# Patient Record
Sex: FEMALE | Race: WHITE | URBAN - NONMETROPOLITAN AREA
[De-identification: names, ages, dates, MRNs, and addresses within clinical notes are randomized per-mention and may not be internally consistent; named-entity substitution may affect disease eponyms.]

---

## 2017-02-23 ENCOUNTER — OFFICE VISIT (OUTPATIENT)
Dept: CARDIOLOGY | Facility: CLINIC | Age: 77
End: 2017-02-23
Payer: MEDICARE

## 2017-02-23 VITALS
OXYGEN SATURATION: 97 % | HEIGHT: 62 IN | BODY MASS INDEX: 23.55 KG/M2 | WEIGHT: 128 LBS | SYSTOLIC BLOOD PRESSURE: 150 MMHG | DIASTOLIC BLOOD PRESSURE: 64 MMHG | HEART RATE: 89 BPM

## 2017-02-23 DIAGNOSIS — R00.2 PALPITATION: ICD-10-CM

## 2017-02-23 PROCEDURE — 93000 ELECTROCARDIOGRAM COMPLETE: CPT | Performed by: INTERNAL MEDICINE

## 2017-02-23 PROCEDURE — G8432 DEP SCR NOT DOC, RNG: HCPCS | Performed by: INTERNAL MEDICINE

## 2017-02-23 PROCEDURE — G8420 CALC BMI NORM PARAMETERS: HCPCS | Performed by: INTERNAL MEDICINE

## 2017-02-23 PROCEDURE — 1101F PT FALLS ASSESS-DOCD LE1/YR: CPT | Mod: 8P | Performed by: INTERNAL MEDICINE

## 2017-02-23 PROCEDURE — G8484 FLU IMMUNIZE NO ADMIN: HCPCS | Performed by: INTERNAL MEDICINE

## 2017-02-23 PROCEDURE — 99204 OFFICE O/P NEW MOD 45 MIN: CPT | Performed by: INTERNAL MEDICINE

## 2017-02-23 PROCEDURE — 4040F PNEUMOC VAC/ADMIN/RCVD: CPT | Mod: 8P | Performed by: INTERNAL MEDICINE

## 2017-02-23 PROCEDURE — 4004F PT TOBACCO SCREEN RCVD TLK: CPT | Mod: 8P | Performed by: INTERNAL MEDICINE

## 2017-02-23 RX ORDER — LOSARTAN POTASSIUM 50 MG/1
50 TABLET ORAL DAILY
COMMUNITY

## 2017-02-23 RX ORDER — PRAVASTATIN SODIUM 20 MG
20 TABLET ORAL NIGHTLY
COMMUNITY

## 2017-02-23 RX ORDER — AMLODIPINE BESYLATE 10 MG/1
5 TABLET ORAL DAILY
COMMUNITY

## 2017-02-23 RX ORDER — ALPRAZOLAM 0.25 MG/1
0.25 TABLET ORAL NIGHTLY PRN
COMMUNITY

## 2017-02-23 RX ORDER — HYDROCHLOROTHIAZIDE 25 MG/1
25 TABLET ORAL DAILY
COMMUNITY

## 2017-02-23 ASSESSMENT — ENCOUNTER SYMPTOMS
NAUSEA: 0
DIZZINESS: 0
BLURRED VISION: 0
EYE DISCHARGE: 0
COUGH: 1
HEADACHES: 0
MYALGIAS: 0
PND: 0
NERVOUS/ANXIOUS: 1
CHILLS: 0
BRUISES/BLEEDS EASILY: 0
DEPRESSION: 0
PALPITATIONS: 1
SHORTNESS OF BREATH: 0
FEVER: 0
HEARTBURN: 0

## 2017-02-23 NOTE — Clinical Note
[facility] [address] [place] [address]  Phone: [phone]  Fax: [phone]              Kaitlynn Winter  1940    Encounter Date: 2017    Lloyd Rodríguez M.D.          PROGRESS NOTE:  Subjective:   Kaitlynn Winter is a 76 y.o. female who presents today in consultation at the request of Noami Ray for palpitations.  Patient is under considerable stress at this time.  Son  within the last year.  Her  is facing heart surgery with valve replacement in 2 weeks.  She reports a long history of intermittent palpitations that do not sound sustained.  She has no symptoms of heart disease.  She recalls being thoroughly evaluated by cardiology greater than 7 years ago. These records are not available.  Family history: Her mother  at age 89 and had a lifelong history of palpitations without treatment.  Social history: As above. No tobacco. No excessive alcohol.  Past medical history: Treated hypertension and treated hyperlipidemia    Recent Holter monitor was reported as showing some premature beats both atrial and ventricular and a 45 second run of atrial fibrillation.  The Copies of the Holter monitor are present on our electronic health record for review but in large part are uninterpretable due to the limitations of fax technology.  Office EKG today demonstrates sinus rhythm and nonspecific ST-T abnormalities    No past medical history on file.  No past surgical history on file.  No family history on file.  History   Smoking status   • Not on file   Smokeless tobacco   • Not on file     Allergies   Allergen Reactions   • Codeine    • Sulfa Drugs      Outpatient Encounter Prescriptions as of 2017   Medication Sig Dispense Refill   • Multiple Vitamins-Minerals (PRESERVISION AREDS PO) Take  by mouth.     • amlodipine (NORVASC) 10 MG Tab Take 5 mg by mouth every day.     • alprazolam (XANAX) 0.25 MG Tab Take 0.25 mg by mouth at  "bedtime as needed for Sleep.     • aspirin EC (ECOTRIN) 81 MG Tablet Delayed Response Take 81 mg by mouth every day.     • Polyethyl Glycol-Propyl Glycol (SYSTANE OP) by Ophthalmic route.     • Omega-3 Fatty Acids (OMEGA 3 PO) Take  by mouth.     • Calcium Citrate-Vitamin D (CALCITRATE/VITAMIN D PO) Take  by mouth.     • hydrochlorothiazide (HYDRODIURIL) 25 MG Tab Take 25 mg by mouth every day.     • losartan (COZAAR) 50 MG Tab Take 50 mg by mouth every day.     • pravastatin (PRAVACHOL) 20 MG Tab Take 20 mg by mouth every evening.       No facility-administered encounter medications on file as of 2/23/2017.     Review of Systems   Constitutional: Negative for fever, chills and malaise/fatigue.   Eyes: Negative for blurred vision and discharge.   Respiratory: Positive for cough. Negative for shortness of breath.    Cardiovascular: Positive for palpitations. Negative for chest pain, leg swelling and PND.   Gastrointestinal: Negative for heartburn and nausea.   Genitourinary: Negative for dysuria and urgency.   Musculoskeletal: Negative for myalgias.   Skin: Negative for itching and rash.   Neurological: Negative for dizziness and headaches.   Endo/Heme/Allergies: Negative for environmental allergies. Does not bruise/bleed easily.   Psychiatric/Behavioral: Negative for depression. The patient is nervous/anxious.         Objective:   /64 mmHg  Pulse 89  Ht 1.575 m (5' 2\")  Wt 58.06 kg (128 lb)  BMI 23.41 kg/m2  SpO2 97%    Physical Exam   Constitutional: She is oriented to person, place, and time. She appears well-developed and well-nourished.   HENT:   Head: Normocephalic and atraumatic.   Eyes: Conjunctivae and EOM are normal. No scleral icterus.   Neck: Neck supple. No JVD present. No thyromegaly present.   Cardiovascular: Normal rate, regular rhythm and intact distal pulses.  Exam reveals no gallop and no friction rub.    Murmur heard.   Systolic murmur is present with a grade of 2/6   Systolic murmur at " "apex   Pulmonary/Chest: Effort normal and breath sounds normal. No respiratory distress. She has no wheezes. She has no rales. She exhibits no tenderness.   Abdominal: Soft. Bowel sounds are normal. She exhibits no distension and no mass. There is no tenderness.   Neurological: She is alert and oriented to person, place, and time. Coordination normal.   Skin: Skin is warm and dry. No rash noted. No pallor.   Psychiatric: She has a normal mood and affect. Her behavior is normal. Judgment and thought content normal.       Assessment:     1. Palpitation  EKG       Medical Decision Making:  Today's Assessment / Status / Plan:   The faxed copies of Holter monitor are in large part uninterpretable due to the limits of  fax technology.  Certainly, the ventricular and atrial premature beats are of no major concern clinically.  If she had 45 seconds of atrial fibrillation, I can neither substantiate it or refute it due to the poor quality of the faxed image at the time of reportedly \"atrial fibrillation\".    She states a heart murmur has been heard in the past.  She states that many years ago she had a full cardiology workup that was reassuring. His records may be able to be reviewed and we will make an effort to do that.  It would also be good to get a original copy of the Holter monitor for further review.    Currently however no further testing or drugs. Strong reassurance regarding the usual benign implications of premature beats.  No plan for return at this time  Thank you for this consultation          ROSE MARIE Delgado  [address] [address]  VIA Facsimile: [phone]                   "

## 2017-02-23 NOTE — PROGRESS NOTES
Subjective:   Kaitlynn Winter is a 76 y.o. female who presents today in consultation at the request of Penny Melton for palpitations.  Patient is under considerable stress at this time.  Son  within the last year.  Her  is facing heart surgery with valve replacement in 2 weeks.  She reports a long history of intermittent palpitations that do not sound sustained.  She has no symptoms of heart disease.  She recalls being thoroughly evaluated by cardiology greater than 7 years ago. These records are not available.  Family history: Her mother  at age 89 and had a lifelong history of palpitations without treatment.  Social history: As above. No tobacco. No excessive alcohol.  Past medical history: Treated hypertension and treated hyperlipidemia    Recent Holter monitor was reported as showing some premature beats both atrial and ventricular and a 45 second run of atrial fibrillation.  The Copies of the Holter monitor are present on our electronic health record for review but in large part are uninterpretable due to the limitations of fax technology.  Office EKG today demonstrates sinus rhythm and nonspecific ST-T abnormalities    No past medical history on file.  No past surgical history on file.  No family history on file.  History   Smoking status   • Not on file   Smokeless tobacco   • Not on file     Allergies   Allergen Reactions   • Codeine    • Sulfa Drugs      Outpatient Encounter Prescriptions as of 2017   Medication Sig Dispense Refill   • Multiple Vitamins-Minerals (PRESERVISION AREDS PO) Take  by mouth.     • amlodipine (NORVASC) 10 MG Tab Take 5 mg by mouth every day.     • alprazolam (XANAX) 0.25 MG Tab Take 0.25 mg by mouth at bedtime as needed for Sleep.     • aspirin EC (ECOTRIN) 81 MG Tablet Delayed Response Take 81 mg by mouth every day.     • Polyethyl Glycol-Propyl Glycol (SYSTANE OP) by Ophthalmic route.     • Omega-3 Fatty Acids (OMEGA 3 PO) Take  by mouth.     • Calcium  "Citrate-Vitamin D (CALCITRATE/VITAMIN D PO) Take  by mouth.     • hydrochlorothiazide (HYDRODIURIL) 25 MG Tab Take 25 mg by mouth every day.     • losartan (COZAAR) 50 MG Tab Take 50 mg by mouth every day.     • pravastatin (PRAVACHOL) 20 MG Tab Take 20 mg by mouth every evening.       No facility-administered encounter medications on file as of 2/23/2017.     Review of Systems   Constitutional: Negative for fever, chills and malaise/fatigue.   Eyes: Negative for blurred vision and discharge.   Respiratory: Positive for cough. Negative for shortness of breath.    Cardiovascular: Positive for palpitations. Negative for chest pain, leg swelling and PND.   Gastrointestinal: Negative for heartburn and nausea.   Genitourinary: Negative for dysuria and urgency.   Musculoskeletal: Negative for myalgias.   Skin: Negative for itching and rash.   Neurological: Negative for dizziness and headaches.   Endo/Heme/Allergies: Negative for environmental allergies. Does not bruise/bleed easily.   Psychiatric/Behavioral: Negative for depression. The patient is nervous/anxious.         Objective:   /64 mmHg  Pulse 89  Ht 1.575 m (5' 2\")  Wt 58.06 kg (128 lb)  BMI 23.41 kg/m2  SpO2 97%    Physical Exam   Constitutional: She is oriented to person, place, and time. She appears well-developed and well-nourished.   HENT:   Head: Normocephalic and atraumatic.   Eyes: Conjunctivae and EOM are normal. No scleral icterus.   Neck: Neck supple. No JVD present. No thyromegaly present.   Cardiovascular: Normal rate, regular rhythm and intact distal pulses.  Exam reveals no gallop and no friction rub.    Murmur heard.   Systolic murmur is present with a grade of 2/6   Systolic murmur at apex   Pulmonary/Chest: Effort normal and breath sounds normal. No respiratory distress. She has no wheezes. She has no rales. She exhibits no tenderness.   Abdominal: Soft. Bowel sounds are normal. She exhibits no distension and no mass. There is no " "tenderness.   Neurological: She is alert and oriented to person, place, and time. Coordination normal.   Skin: Skin is warm and dry. No rash noted. No pallor.   Psychiatric: She has a normal mood and affect. Her behavior is normal. Judgment and thought content normal.       Assessment:     1. Palpitation  EKG       Medical Decision Making:  Today's Assessment / Status / Plan:   The faxed copies of Holter monitor are in large part uninterpretable due to the limits of  fax technology.  Certainly, the ventricular and atrial premature beats are of no major concern clinically.  If she had 45 seconds of atrial fibrillation, I can neither substantiate it or refute it due to the poor quality of the faxed image at the time of reportedly \"atrial fibrillation\".    She states a heart murmur has been heard in the past.  She states that many years ago she had a full cardiology workup that was reassuring. His records may be able to be reviewed and we will make an effort to do that.  It would also be good to get a original copy of the Holter monitor for further review.    Currently however no further testing or drugs. Strong reassurance regarding the usual benign implications of premature beats.  No plan for return at this time  Thank you for this consultation      "

## 2017-03-02 LAB — EKG IMPRESSION: NORMAL
